# Patient Record
Sex: MALE | Race: WHITE | Employment: FULL TIME | URBAN - METROPOLITAN AREA
[De-identification: names, ages, dates, MRNs, and addresses within clinical notes are randomized per-mention and may not be internally consistent; named-entity substitution may affect disease eponyms.]

---

## 2023-02-23 ENCOUNTER — OFFICE VISIT (OUTPATIENT)
Dept: UROLOGY | Age: 63
End: 2023-02-23

## 2023-02-23 DIAGNOSIS — R39.9 LOWER URINARY TRACT SYMPTOMS (LUTS): ICD-10-CM

## 2023-02-23 DIAGNOSIS — R97.20 ELEVATED PSA: Primary | ICD-10-CM

## 2023-02-23 LAB
BILIRUBIN, URINE, POC: NEGATIVE
BLOOD URINE, POC: NEGATIVE
GLUCOSE URINE, POC: NEGATIVE
KETONES, URINE, POC: NEGATIVE
LEUKOCYTE ESTERASE, URINE, POC: NEGATIVE
NITRITE, URINE, POC: NEGATIVE
PH, URINE, POC: 7 (ref 4.6–8)
PROTEIN,URINE, POC: NEGATIVE
SPECIFIC GRAVITY, URINE, POC: 1.02 (ref 1–1.03)
URINALYSIS CLARITY, POC: NORMAL
URINALYSIS COLOR, POC: YELLOW
UROBILINOGEN, POC: NORMAL

## 2023-02-23 RX ORDER — LOSARTAN POTASSIUM 50 MG/1
50 TABLET ORAL DAILY
COMMUNITY
Start: 2021-11-04

## 2023-02-23 RX ORDER — AMLODIPINE BESYLATE 10 MG/1
10 TABLET ORAL DAILY
COMMUNITY
Start: 2022-03-08

## 2023-02-23 RX ORDER — BUSPIRONE HYDROCHLORIDE 5 MG/1
TABLET ORAL
COMMUNITY
Start: 2023-01-26

## 2023-02-23 RX ORDER — ATORVASTATIN CALCIUM 20 MG/1
20 TABLET, FILM COATED ORAL
COMMUNITY
Start: 2023-01-26

## 2023-02-23 NOTE — PROGRESS NOTES
[facility]  [address] [facility] [address], [address]  [phone]          Chris Rater  : 1960    Chief Complaint   Patient presents with    New Patient          HPI     Chris Gambino is a 63 y.o. male referred for elevated PSA. He recently moved to [facility] from [facility]. He has no personal or family h/o urological CA. He has occ weak stream and nocturia. No other LUTS. No gross hematuria. He has h/o melanoma. Required surgery. Previous hernia repair and excision of lipoma in groin. PSA: (10/25/22) 7.16, (23) 8.47    He reports having PSA several years ago but unsure of the value. History reviewed. No pertinent past medical history. History reviewed. No pertinent surgical history. Current Outpatient Medications   Medication Sig Dispense Refill    amLODIPine (NORVASC) 10 MG tablet Take 10 mg by mouth daily      atorvastatin (LIPITOR) 20 MG tablet Take 20 mg by mouth      busPIRone (BUSPAR) 5 MG tablet       losartan (COZAAR) 50 MG tablet Take 50 mg by mouth daily       No current facility-administered medications for this visit.      Allergies   Allergen Reactions    Codeine Other (See Comments)     Dilusion  Dilusion      Erythromycin Other (See Comments)     Other reaction(s): Nausea and/or vomiting-Intolerance    Lisinopril Other (See Comments)     COUGH     Social History     Socioeconomic History    Marital status:      Spouse name: Not on file    Number of children: Not on file    Years of education: Not on file    Highest education level: Not on file   Occupational History    Not on file   Tobacco Use    Smoking status: Never    Smokeless tobacco: Never   Substance and Sexual Activity    Alcohol use: Not on file    Drug use: Not on file    Sexual activity: Not on file   Other Topics Concern    Not on file   Social History Narrative    Not on file     Social Determinants of Health     Financial Resource Strain: Not on file   Food Insecurity: Not on file   Transportation Needs: Not on file   Physical Activity: Not on file   Stress: Not on file   Social Connections: Not on file   Intimate Partner Violence: Not on file   Housing Stability: Not on file     History reviewed. No pertinent family history. ROS    Urinalysis  UA - Dipstick  Results for orders placed or performed in visit on 02/23/23   AMB POC URINALYSIS DIP STICK AUTO W/O MICRO   Result Value Ref Range    Color, Urine, POC Yellow     Clarity, Urine, POC Slightly Cloudy     Glucose, Urine, POC Negative Negative    Bilirubin, Urine, POC Negative Negative    Ketones, Urine, POC Negative Negative    Specific Gravity, Urine, POC 1.020 1.001 - 1.035    Blood, Urine, POC Negative Negative    pH, Urine, POC 7.0 4.6 - 8.0    Protein, Urine, POC Negative Negative    Urobilinogen, POC Normal     Nitrite, Urine, POC Negative Negative    Leukocyte Esterase, Urine, POC Negative Negative       UA - Micro  WBC - 0  RBC - 0  Bacteria - 0  Epith - 0    PHYSICAL EXAM    General appearance - well appearing and in no distress  Mental status - alert, oriented to person, place, and time  Neck - supple, no significant adenopathy  Chest/Lung-  Quiet, even and easy respiratory effort without use of accessory muscles  Skin - normal coloration and turgor, no rashes      Assessment and Plan    ICD-10-CM    1. Elevated PSA  R97.20 AMB POC URINALYSIS DIP STICK AUTO W/O MICRO     MRI PROSTATE W WO CONTRAST      2. Lower urinary tract symptoms (LUTS)  R39.9 MRI PROSTATE W WO CONTRAST          LUTS- urine normal. Discussed ? BPH. He opts for no tx at this time. Elev PSA- We first discussed the physiology of psa. We also discussed potential causes of elevated psa including prostate cancer, inflammation, infection, BPH, and idiopathic elevations. Treatment options including rechecking psa vs. MRI prostate vs. going forward with prostate biopsy were discussed. He opts to have MRI prostate.     I will contact him w MRI results. Follow up pending this. Advised to contact me sooner if needed. Alexsander Iverson is supervising physician today and he approves plan of care.

## 2023-03-06 ENCOUNTER — HOSPITAL ENCOUNTER (OUTPATIENT)
Dept: MRI IMAGING | Age: 63
Discharge: HOME | End: 2023-03-09

## 2023-03-06 DIAGNOSIS — R97.20 ELEVATED PSA: ICD-10-CM

## 2023-03-06 DIAGNOSIS — R39.9 LOWER URINARY TRACT SYMPTOMS (LUTS): ICD-10-CM

## 2025-01-22 ENCOUNTER — OFFICE VISIT (OUTPATIENT)
Dept: UROLOGY | Age: 65
End: 2025-01-22
Payer: COMMERCIAL

## 2025-01-22 DIAGNOSIS — R97.20 ELEVATED PSA: ICD-10-CM

## 2025-01-22 DIAGNOSIS — N40.1 BPH WITH OBSTRUCTION/LOWER URINARY TRACT SYMPTOMS: Primary | ICD-10-CM

## 2025-01-22 DIAGNOSIS — N13.8 BPH WITH OBSTRUCTION/LOWER URINARY TRACT SYMPTOMS: Primary | ICD-10-CM

## 2025-01-22 DIAGNOSIS — R36.1 HEMATOSPERMIA: ICD-10-CM

## 2025-01-22 PROCEDURE — 99214 OFFICE O/P EST MOD 30 MIN: CPT | Performed by: NURSE PRACTITIONER

## 2025-01-22 ASSESSMENT — ENCOUNTER SYMPTOMS
BACK PAIN: 0
NAUSEA: 0

## 2025-01-22 NOTE — PROGRESS NOTES
[facility]  [address]  [place] [address]  [phone]          David Ya  : 1960    Chief Complaint   Patient presents with    Elevated PSA          HPI     David Ya is a 64 y.o. male referred for elevated PSA. He recently moved to [place] from [place], [place]. He has no personal or family h/o urological CA. He has occ weak stream and nocturia. No other LUTS. No gross hematuria. Hematospermia once in the past year. Resolved.      He has h/o melanoma. Required surgery.     Previous hernia repair and excision of lipoma in groin.      PSA: (10/25/22) 7.16, (23) 8.47, (24) 6.5     He reports having PSA several years ago but unsure of the value.    Prostate MRI 3/6/23 wo evidence of significant prostate CA. Note prostatomegaly. Prostate Size: 4.9 x 6.3 x 6.8 cm with a calculated volume of 109.2 mL           History reviewed. No pertinent past medical history.  History reviewed. No pertinent surgical history.  Current Outpatient Medications   Medication Sig Dispense Refill    amLODIPine (NORVASC) 10 MG tablet Take 1 tablet by mouth daily      atorvastatin (LIPITOR) 20 MG tablet Take 1 tablet by mouth      busPIRone (BUSPAR) 5 MG tablet       losartan (COZAAR) 50 MG tablet Take 1 tablet by mouth daily       No current facility-administered medications for this visit.     Allergies   Allergen Reactions    Codeine Other (See Comments)     Dilusion  Dilusion      Erythromycin Other (See Comments)     Other reaction(s): Nausea and/or vomiting-Intolerance    Lisinopril Other (See Comments)     COUGH     Social History     Socioeconomic History    Marital status:      Spouse name: Not on file    Number of children: Not on file    Years of education: Not on file    Highest education level: Not on file   Occupational History    Not on file   Tobacco Use    Smoking status: Never    Smokeless tobacco: Never   Substance and Sexual Activity    Alcohol

## 2025-02-24 ENCOUNTER — NURSE ONLY (OUTPATIENT)
Dept: UROLOGY | Age: 65
End: 2025-02-24

## 2025-02-24 DIAGNOSIS — R33.9 URINARY RETENTION: Primary | ICD-10-CM

## 2025-02-24 NOTE — PROGRESS NOTES
Pt came in for catheter removal. 18f dawkins catheter removed without incident.  Pt instructed to push fluids, 6-8 glasses of water and if he has not voided on his own by 3:00pm to call us and come in this afternoon for reinsertion.